# Patient Record
Sex: MALE | Race: WHITE | Employment: PART TIME | URBAN - METROPOLITAN AREA
[De-identification: names, ages, dates, MRNs, and addresses within clinical notes are randomized per-mention and may not be internally consistent; named-entity substitution may affect disease eponyms.]

---

## 2020-09-19 ENCOUNTER — HOSPITAL ENCOUNTER (EMERGENCY)
Age: 35
Discharge: HOME | End: 2020-09-19
Attending: STUDENT IN AN ORGANIZED HEALTH CARE EDUCATION/TRAINING PROGRAM
Payer: MEDICAID

## 2020-09-19 VITALS
DIASTOLIC BLOOD PRESSURE: 76 MMHG | RESPIRATION RATE: 16 BRPM | BODY MASS INDEX: 24.38 KG/M2 | SYSTOLIC BLOOD PRESSURE: 143 MMHG | HEART RATE: 58 BPM | HEIGHT: 72 IN | TEMPERATURE: 98 F | WEIGHT: 180 LBS | OXYGEN SATURATION: 100 %

## 2020-09-19 PROCEDURE — 99282 EMERGENCY DEPT VISIT SF MDM: CPT

## 2020-09-19 ASSESSMENT — PAIN DESCRIPTION - LOCATION: LOCATION: LEG

## 2020-09-19 ASSESSMENT — ENCOUNTER SYMPTOMS
COUGH: 0
RESPIRATORY NEGATIVE: 1
ROS SKIN COMMENTS: +LESION

## 2020-09-19 ASSESSMENT — PAIN DESCRIPTION - PAIN TYPE: TYPE: ACUTE PAIN

## 2020-09-19 ASSESSMENT — PAIN DESCRIPTION - ORIENTATION: ORIENTATION: LEFT

## 2020-09-19 NOTE — ED PROVIDER NOTES
[address] ENCOUNTER          PHYSICIAN ASSISTANT NOTE       Date of evaluation: 9/19/2020    Chief Complaint     Skin Tag      History of Present Illness     Alice Recinos is a 28 y.o. male who presents with skin tag. Patient presents with a skin lesion to his left thigh that is been present for about 15 years. Today when he got out of the shower it seemed red and swollen. Patient reports it is improved now. Patient denies any others skin lesions elsewhere. Otherwise well. Review of Systems     Review of Systems   Constitutional: Negative. Negative for fever. Respiratory: Negative. Negative for cough. Skin:        +lesion   Neurological: Negative. All other systems reviewed and are negative. Past Medical, Surgical, Family, and Social History     He has no past medical history on file. He has no past surgical history on file. His family history is not on file. He reports that he has never smoked. He has never used smokeless tobacco. He reports current alcohol use. He reports previous drug use. Drug: Marijuana. Medications     Previous Medications    No medications on file       Allergies     He has No Known Allergies. Physical Exam     INITIAL VITALS: BP: (!) 143/76, Temp: 98 °F (36.7 °C), Pulse: 58, Resp: 16, SpO2: 100 %  Physical Exam  Vitals signs and nursing note reviewed. Constitutional:       General: He is not in acute distress. Appearance: He is not ill-appearing. HENT:      Head: Normocephalic and atraumatic. Pulmonary:      Effort: Pulmonary effort is normal.   Skin:     General: Skin is warm and dry. Comments: Large pedunculated lesion to the left inner thigh. No erythema, warmth, drainage, fluctuance. Neurological:      Mental Status: He is alert. Mental status is at baseline. Psychiatric:         Mood and Affect: Mood normal.         Behavior: Behavior normal.         Thought Content:  Thought content normal. Judgment: Judgment normal.         Diagnostic Results       RADIOLOGY:  No orders to display       LABS:   No results found for this visit on 09/19/20. ED BEDSIDE ULTRASOUND:      RECENT VITALS:  BP: (!) 143/76, Temp: 98 °F (36.7 °C), Pulse: 58, Resp: 16, SpO2: 100 %     Procedures         ED Course     Nursing Notes, Past Medical Hx,Past Surgical Hx, Social Hx, Allergies, and Family Hx were reviewed. The patient was given the following medications:  No orders of the defined types were placed in this encounter. CONSULTS:  None    MEDICAL DECISION MAKING / ASSESSMENT / PLAN     Meryl Lee is a 28 y.o. male with skin lesion. Patient is well appearing and in no acute distress. Patient with a pedunculated lesion to the left inner thigh with no signs of infection. Patient to follow-up with Dermatology for removal. Return precautions discussed. This patient was also evaluated by the attending physician. All care plans were discussed and agreed upon. Clinical Impression     1.  Skin lesion of left lower extremity        Disposition     PATIENT REFERRED TO:  Dermatology  [place] [id number] [phone]  Schedule an appointment as soon as possible for a visit       [facility] Emergency Department  [address] [place] [address]  [phone]    As needed, If symptoms worsen      DISCHARGE MEDICATIONS:  New Prescriptions    No medications on file       DISPOSITION Decision To Discharge 09/19/2020 02:39:11 PM        Alexandru Farmer PA-C  09/19/20 [id number]

## 2020-09-19 NOTE — ED TRIAGE NOTES
Pt here today for a skin tag to his upper left thigh.  States noticed that it was inflamed and painful

## 2020-09-19 NOTE — ED NOTES
Patient prepared for and ready to be discharged. Patient discharged at this time in no acute distress after verbalizing understanding of discharge instructions. Patient left after receiving After Visit Summary instructions.       Naga Burks RN  09/19/20 [id number]